# Patient Record
(demographics unavailable — no encounter records)

---

## 2025-03-11 NOTE — PHYSICAL EXAM
[FreeTextEntry3] : General: well appearing person in nad, alert, pleasant Focused Skin Exam per patient preference: thinning hair density on frontal hairline, loose scale throughout scalp  variability of hair shaft diameter throughout scalp no erythema or perifollicular scale, no e/o scarring, but exam limited by hairstyle few comedones on cheeks and brown macules

## 2025-03-11 NOTE — ASSESSMENT
[FreeTextEntry1] : # Hair loss, chronic - new diagnosis with uncertain prognosis - ddx: favor multifactorial- traction, AGA. No e/o scarring hairloss today (although exam limited by hairstyle today) - prev failed minoxidil topical trial for 3 mo - offered bx, defers - prefers to try topicals and not systemic meds at the moment - pt previously tried womens rogaine (minoxidil 2%). Recommend trying mens rogaine (minoxidil 5%) to scalp once daily and giving at least 6-12 mo trial. SED including irritation.  - discussed gentle hair care, loose styles - Intralesional injection of Kenalog, 2.5 mg/ml A total of 1.4 ml was injected across frontal and frontotemporal hairline 0.05 to 0.1cc placed 1cm apart.  The risks/benefits/alternatives of intralesional steroid injections were reviewed with the patient which include but are not limited to pain, atrophy, and dyspigmentation. Intralesional injections were performed in the affected area. The patient tolerated the procedure well.  # Seborrheic Dermatitis, chronic, flaring - Discussed nature of chronic condition, treatment expectations, alternatives, risks and benefits  - Start Ketoconazole 2% shampoo, use in place of regular shampoo with each washing, use at least once a week. Leave on for 5-10 min then rinse. SED.  - Start Mometasone 0.1% solution once daily to affected areas for up to 1-2 weeks on/1 week OFF cycles. Repeat cycles as needed. SED.  - long term steroid use side effects such as skin atrophy, hypopigmentation and telangiectasis discussed - patient agrees to use topical steroids sparingly and as prescribed. Repeat cycles as needed. SED.    # Acne vulgaris, face, mild and # PIH - Pt oriented about chronic nature of condition, treatment alternatives, risks and benefits - counseled to STOP hydroquinone for now. SED and risks of ochronosis.  - recommend use of sunscreen SPF 30 or above daily - Start tretinoin 0.025% cream to face qHS. Recommend starting every third night and increasing as tolerated. SED including irritation. Cannot use in pregnancy  RTC 6 weeks for repeat ILK

## 2025-03-11 NOTE — HISTORY OF PRESENT ILLNESS
[FreeTextEntry1] : NP- hair loss, acne, discoloration [de-identified] : Ms. CHRISSY SULTANA  is a 34 year y/o F here for evaluation of below   # Hair loss- noticed increased shedding after having her baby girl 5 yrs ago and noticed hair never grew back as full. has thinning along frontal hairline. Sometimes itchy. tried Rogaine for 3 months a couple yrs ago and did not notice a difference.   # Acne and dark spots on face for the last few years. Using hydroquinone on/off sporadically lately and also before her pregnancy 5 yrs ago. No acne actives.   # Dandruff for months. Using OTC Nizoral with partial improvement.   Personal hx of skin cancer: no FHx of skin cancer: no Social Hx: works as a

## 2025-03-11 NOTE — HISTORY OF PRESENT ILLNESS
[FreeTextEntry1] : NP- hair loss, acne, discoloration [de-identified] : Ms. CHRISSY SULTNAA  is a 34 year y/o F here for evaluation of below   # Hair loss- noticed increased shedding after having her baby girl 5 yrs ago and noticed hair never grew back as full. has thinning along frontal hairline. Sometimes itchy. tried Rogaine for 3 months a couple yrs ago and did not notice a difference.   # Acne and dark spots on face for the last few years. Using hydroquinone on/off sporadically lately and also before her pregnancy 5 yrs ago. No acne actives.   # Dandruff for months. Using OTC Nizoral with partial improvement.   Personal hx of skin cancer: no FHx of skin cancer: no Social Hx: works as a

## 2025-03-11 NOTE — HISTORY OF PRESENT ILLNESS
[FreeTextEntry1] : NP- hair loss, acne, discoloration [de-identified] : Ms. CHRISSY SULTANA  is a 34 year y/o F here for evaluation of below   # Hair loss- noticed increased shedding after having her baby girl 5 yrs ago and noticed hair never grew back as full. has thinning along frontal hairline. Sometimes itchy. tried Rogaine for 3 months a couple yrs ago and did not notice a difference.   # Acne and dark spots on face for the last few years. Using hydroquinone on/off sporadically lately and also before her pregnancy 5 yrs ago. No acne actives.   # Dandruff for months. Using OTC Nizoral with partial improvement.   Personal hx of skin cancer: no FHx of skin cancer: no Social Hx: works as a

## 2025-04-22 NOTE — HISTORY OF PRESENT ILLNESS
[FreeTextEntry1] : RPV- hair loss [de-identified] : Ms. CHRISSY SULTANA  is a 34 year y/o F here for evaluation of below   # Hair loss- noticed increased shedding after having her baby girl 5 yrs ago and noticed hair never grew back as full. has thinning along frontal hairline. Sometimes itchy. tried Rogaine for 3 months a couple yrs ago and did not notice a difference. S/p 1.4cc of ILK 2.5 with no response. Here for round 2 of injections    # Dandruff for months. Since LV has used ketoconazole shampoo 3x/week and mometasone PRN with great improvement   Personal hx of skin cancer: no FHx of skin cancer: no Social Hx: works as a  for the last 10 years

## 2025-04-22 NOTE — PHYSICAL EXAM
[FreeTextEntry3] : General: well appearing person in nad, alert, pleasant Focused Skin Exam per patient preference: thinning hair density on frontal hairline, R>L no erythema or perifollicular scale, no e/o scarring, but exam limited by hairstyle

## 2025-04-22 NOTE — ASSESSMENT
[FreeTextEntry1] : # Hair loss, chronic - chronic, not at treatment goal - ddx: favor multifactorial- traction, AGA. No e/o scarring hairloss today (although exam limited by hairstyle today) - prev failed minoxidil topical trial for 3 mo - offered bx, defers - prefers to try topicals and not systemic meds at the moment -c/w minoxidil 5% to scalp once daily and giving at least 6-12 mo trial. SED including irritation. do not get on face, need for continued use - discussed gentle hair care, loose styles - start SM topical metformin solution 1-2x a day to AA, SED. Sent to email on file - Intralesional injection of Kenalog, 2.5 mg/ml Round #2- discussed we can trial 6 sessions  A total of 1.7 ml was injected across frontal and frontotemporal hairline  The risks/benefits/alternatives of intralesional steroid injections were reviewed with the patient which include but are not limited to pain, atrophy, and dyspigmentation. Intralesional injections were performed in the affected area. The patient tolerated the procedure well.  # Seborrheic Dermatitis, chronic, improved - Discussed nature of chronic condition, treatment expectations, alternatives, risks and benefits  - Continue Ketoconazole 2% shampoo, use in place of regular shampoo with each washing, use at least once a week. Leave on for 5-10 min then rinse. SED.  - Continue Mometasone 0.1% solution once daily to affected areas for up to 1-2 weeks on/1 week OFF cycles. Repeat cycles as needed. SED.  - long term steroid use side effects such as skin atrophy, hypopigmentation and telangiectasis discussed - patient agrees to use topical steroids sparingly and as prescribed. Repeat cycles as needed. SED.     RTC 6 weeks for repeat ILK